# Patient Record
Sex: MALE | URBAN - METROPOLITAN AREA
[De-identification: names, ages, dates, MRNs, and addresses within clinical notes are randomized per-mention and may not be internally consistent; named-entity substitution may affect disease eponyms.]

---

## 2022-01-27 ENCOUNTER — LAB REQUISITION (OUTPATIENT)
Dept: LAB | Age: 62
End: 2022-01-27

## 2022-01-27 DIAGNOSIS — Z00.00 ENCOUNTER FOR GENERAL ADULT MEDICAL EXAMINATION WITHOUT ABNORMAL FINDINGS: ICD-10-CM

## 2022-01-27 PROCEDURE — PSEU8270 LIPID PANEL WITHOUT REFLEX: Performed by: CLINICAL MEDICAL LABORATORY

## 2022-01-27 PROCEDURE — 80061 LIPID PANEL: CPT | Performed by: CLINICAL MEDICAL LABORATORY

## 2022-01-28 LAB
CHOLEST SERPL-MCNC: 131 MG/DL
CHOLEST/HDLC SERPL: 2.3 {RATIO}
FASTING DURATION TIME PATIENT: 12 HOURS (ref 0–999)
HDLC SERPL-MCNC: 57 MG/DL
LDLC SERPL CALC-MCNC: 57 MG/DL
NONHDLC SERPL-MCNC: 74 MG/DL
TRIGL SERPL-MCNC: 86 MG/DL

## 2023-09-01 ENCOUNTER — LAB REQUISITION (OUTPATIENT)
Dept: OTHER | Facility: LAB | Age: 63
End: 2023-09-01

## 2023-09-01 DIAGNOSIS — Z00.00 ENCOUNTER FOR GENERAL ADULT MEDICAL EXAMINATION WITHOUT ABNORMAL FINDINGS: ICD-10-CM

## 2023-09-01 PROCEDURE — PSEU8270 LIPID PANEL WITHOUT REFLEX: Performed by: CLINICAL MEDICAL LABORATORY

## 2023-09-01 PROCEDURE — 80061 LIPID PANEL: CPT | Performed by: CLINICAL MEDICAL LABORATORY

## 2023-09-01 PROCEDURE — 80053 COMPREHEN METABOLIC PANEL: CPT | Performed by: CLINICAL MEDICAL LABORATORY

## 2023-09-01 PROCEDURE — PSEU8250 COMPREHENSIVE METABOLIC PANEL: Performed by: CLINICAL MEDICAL LABORATORY

## 2023-09-02 LAB
ALBUMIN SERPL-MCNC: 4 G/DL (ref 3.6–5.1)
ALBUMIN/GLOB SERPL: 1.2 {RATIO} (ref 1–2.4)
ALP SERPL-CCNC: 77 UNITS/L (ref 45–117)
ALT SERPL-CCNC: 31 UNITS/L
ANION GAP SERPL CALC-SCNC: 8 MMOL/L (ref 7–19)
AST SERPL-CCNC: 27 UNITS/L
BILIRUB SERPL-MCNC: 0.5 MG/DL (ref 0.2–1)
BUN SERPL-MCNC: 13 MG/DL (ref 6–20)
BUN/CREAT SERPL: 14 (ref 7–25)
CALCIUM SERPL-MCNC: 9.3 MG/DL (ref 8.4–10.2)
CHLORIDE SERPL-SCNC: 106 MMOL/L (ref 97–110)
CHOLEST SERPL-MCNC: 130 MG/DL
CHOLEST/HDLC SERPL: 2.5 {RATIO}
CO2 SERPL-SCNC: 28 MMOL/L (ref 21–32)
CREAT SERPL-MCNC: 0.92 MG/DL (ref 0.67–1.17)
EGFRCR SERPLBLD CKD-EPI 2021: >90 ML/MIN/{1.73_M2}
FASTING DURATION TIME PATIENT: 13 HOURS (ref 0–999)
GLOBULIN SER-MCNC: 3.3 G/DL (ref 2–4)
GLUCOSE SERPL-MCNC: 90 MG/DL (ref 70–99)
HDLC SERPL-MCNC: 52 MG/DL
LDLC SERPL CALC-MCNC: 61 MG/DL
NONHDLC SERPL-MCNC: 78 MG/DL
POTASSIUM SERPL-SCNC: 4.3 MMOL/L (ref 3.4–5.1)
PROT SERPL-MCNC: 7.3 G/DL (ref 6.4–8.2)
SODIUM SERPL-SCNC: 138 MMOL/L (ref 135–145)
TRIGL SERPL-MCNC: 83 MG/DL

## 2024-01-09 ENCOUNTER — TELEPHONE (OUTPATIENT)
Dept: PHYSICAL MEDICINE AND REHAB | Facility: CLINIC | Age: 64
End: 2024-01-09

## 2024-01-09 ENCOUNTER — OFFICE VISIT (OUTPATIENT)
Dept: PHYSICAL MEDICINE AND REHAB | Facility: CLINIC | Age: 64
End: 2024-01-09
Payer: COMMERCIAL

## 2024-01-09 VITALS — WEIGHT: 146 LBS | HEIGHT: 69 IN | BODY MASS INDEX: 21.62 KG/M2

## 2024-01-09 DIAGNOSIS — M54.12 CERVICAL RADICULOPATHY: Primary | ICD-10-CM

## 2024-01-09 DIAGNOSIS — M54.2 TRIGGER POINT OF NECK: ICD-10-CM

## 2024-01-09 DIAGNOSIS — Q76.1 CERVICAL FUSION SYNDROME: ICD-10-CM

## 2024-01-09 DIAGNOSIS — M50.30 DDD (DEGENERATIVE DISC DISEASE), CERVICAL: ICD-10-CM

## 2024-01-09 DIAGNOSIS — M76.32 IT BAND SYNDROME, LEFT: ICD-10-CM

## 2024-01-09 DIAGNOSIS — M47.812 CERVICAL FACET SYNDROME: ICD-10-CM

## 2024-01-09 DIAGNOSIS — M48.02 FORAMINAL STENOSIS OF CERVICAL REGION: ICD-10-CM

## 2024-01-09 PROBLEM — K63.5 COLON POLYPS: Status: ACTIVE | Noted: 2019-03-01

## 2024-01-09 PROBLEM — I25.10 ATHEROSCLEROSIS OF CORONARY ARTERY: Status: ACTIVE | Noted: 2019-05-01

## 2024-01-09 PROBLEM — F32.A DEPRESSION: Status: ACTIVE | Noted: 2024-01-09

## 2024-01-09 PROBLEM — M76.62 ACHILLES TENDINITIS, LEFT LEG: Status: ACTIVE | Noted: 2022-12-21

## 2024-01-09 PROBLEM — M72.2 PLANTAR FASCIITIS: Status: ACTIVE | Noted: 2023-04-25

## 2024-01-09 PROBLEM — E04.2 MULTIPLE THYROID NODULES: Status: ACTIVE | Noted: 2017-01-01

## 2024-01-09 PROBLEM — F43.22 ADJUSTMENT DISORDER WITH ANXIETY: Status: ACTIVE | Noted: 2017-01-25

## 2024-01-09 PROBLEM — L80 VITILIGO: Status: ACTIVE | Noted: 2024-01-09

## 2024-01-09 PROCEDURE — 99204 OFFICE O/P NEW MOD 45 MIN: CPT | Performed by: PHYSICAL MEDICINE & REHABILITATION

## 2024-01-09 PROCEDURE — 3008F BODY MASS INDEX DOCD: CPT | Performed by: PHYSICAL MEDICINE & REHABILITATION

## 2024-01-09 RX ORDER — VORTIOXETINE 10 MG/1
10 TABLET, FILM COATED ORAL DAILY
COMMUNITY

## 2024-01-09 RX ORDER — LORAZEPAM 2 MG/1
2 TABLET ORAL 4 TIMES DAILY
COMMUNITY

## 2024-01-09 RX ORDER — ROSUVASTATIN CALCIUM 20 MG/1
20 TABLET, COATED ORAL NIGHTLY
COMMUNITY
Start: 2022-12-13

## 2024-01-09 RX ORDER — TADALAFIL 2.5 MG/1
1 TABLET ORAL DAILY
COMMUNITY
Start: 2023-06-10

## 2024-01-09 RX ORDER — VALACYCLOVIR HYDROCHLORIDE 500 MG/1
500 TABLET, FILM COATED ORAL DAILY
COMMUNITY

## 2024-01-09 RX ORDER — LORAZEPAM 1 MG/1
1 TABLET ORAL
COMMUNITY
Start: 2023-03-30

## 2024-01-09 NOTE — H&P
[facility]  Clinic H&P    Requesting Physician: Janey Alonso MD, MD    Chief Complaint (Reason for Visit):    Chief Complaint   Patient presents with    Neck Pain     New left handed patient comes in for neck pain that radiates to temporal and frontal lobe with N/T. Pain started 2 months ago, no injury. MRI Cervical. No PT. Requesting a nerve block injections. Takes Advil. Rates pain 7/10.       History of Present Illness:  The patient is a 63 year old right-handed male with a significant past medical history of a C4-C7 ACDF who presents with left-sided neck pain with radiation up to the head and ear associated with numbness and tingling in that same distribution on the left side.  He rates his discomfort a 7 out of 10 and describes it as intermittent.  He denies any radicular symptoms down the left arm.  He has had x-rays as well as an MRI of the cervical spine has been evaluated by Dr. Collins (orthopedic surgery).  He denies any epidural injections.  Dr. Collins was recommending a selective nerve root injection at left C2 and C3.  He is a retired .    PAST MEDICAL HISTORY:   History reviewed. No pertinent past medical history.    PAST SURGICAL HISTORY:   History reviewed. No pertinent surgical history.     FAMILY HISTORY:   History reviewed. No pertinent family history.    SOCIAL HISTORY:   Social History     Occupational History    Not on file   Tobacco Use    Smoking status: Never    Smokeless tobacco: Never   Substance and Sexual Activity    Alcohol use: Not Currently    Drug use: Not Currently    Sexual activity: Not on file       CURRENT MEDICATIONS:   Current Outpatient Medications   Medication Sig Dispense Refill    LORazepam 2 MG Oral Tab Take 1 tablet (2 mg total) by mouth 4 (four) times daily.      LORazepam 1 MG Oral Tab Take 1 tablet (1 mg total) by mouth.      rosuvastatin 20 MG Oral Tab Take 1 tablet (20 mg total) by mouth nightly.       Tadalafil 2.5 MG Oral Tab Take 1 tablet by mouth daily.      valACYclovir 500 MG Oral Tab Take 1 tablet (500 mg total) by mouth daily.      TRINTELLIX 10 MG Oral Tab Take 1 tablet (10 mg total) by mouth daily.          ALLERGIES:   Allergies   Allergen Reactions    Pantoprazole OTHER (SEE COMMENTS) and NAUSEA AND VOMITING         REVIEW OF SYSTEMS:   Review of Systems   Constitutional: Negative.    HENT: Negative.    Eyes: Negative.    Respiratory: Negative.    Cardiovascular: Negative.    Gastrointestinal: Negative.    Genitourinary: Negative.    Musculoskeletal: As per HPI   Skin: Negative.    Neurological: As per HPI  Endo/Heme/Allergies: Negative.    Psychiatric/Behavioral: Negative.      All other systems reviewed and are negative. Pertinent positives and negatives noted in the HPI.        PHYSICAL EXAM:   Ht 69\"   Wt 146 lb (66.2 kg)   BMI 21.56 kg/m²     Body mass index is 21.56 kg/m².      General: No immediate distress  Head: Normocephalic/ Atraumatic  Eyes: Extra-occular movements intact.   Ears: No auricular hematoma or deformities  Mouth: No lesions or ulcerations  Heart: peripheral pulses intact. Normal capillary refill.   Lungs: Non-labored respirations  Abdomen: No abdominal guarding  Extremities: No lower extremity edema bilaterally   Skin: No lesions noted.   Cognition: alert & oriented x 3, attentive, able to follow 2 step commands, comprehention intact, spontaneous speech intact  Motor:    Musculoskeletal:    CERVICAL SPINE:  Inspection: no erythema, swelling, or obvious deformity  Palpation: Tender to palpation over the left cervical upper facets and paraspinals  ROM: Limited in flexion, extension, rotation, and sidebending bilaterally with significant amount of pain in the left posterior lateral cervical spine  Strength: 5/5 in all myotomes of the BILATERAL upper extremities   Sensation: Intact to light touch in all dermatomes of the BILATERAL upper extremities   Reflexes: 2/4 at C5, C6, C7  with a negative Alexis's sign  Spurling Test: negative for radicular symptoms down either extremity bilaterally        Gait:  Normal    Data  No results found for any previous visit.   ]      Radiology Imaging:  I reviewed with the patient his CT and MRI of the cervical spine     History: Neck sprain, initial encounter     Procedure:     MRI of cervical spine is performed without contrast using the following pulse sequences: Sagittal T1-weighted, sagittal T2-weighted, sagittal STIR and axial T2-weighted images.     Comparison: MRI 2/8/2019, plain films 1/17/2019, CT 3/3/2020     Findings:     Patient status post prior ACDF C4-C7. Corresponding osseous bridging fusion and susceptibility artifact.     There is reversal of the cervical lordosis. There are no focal regions of signal alteration in the vertebral marrow. Minimal anterolisthesis of C7 on T1 and C2 on C3-1 lesser degree. Minimal retrolisthesis of C3 on C4.     Congenital narrowing of the spinal canal. Multilevel disc space narrowing with spondylotic ridging, greatest at C3-4.     The cervical spinal cord is intrinsically normal in size without focal signal abnormality. No epidural collection.     At C2-3, there is no significant central canal stenosis. Uncovertebral and facet arthrosis (much greater on the left) causing moderate to severe left-sided neural foraminal narrowing.     At C3-4, there is a diffuse disc/osteophyte complex asymmetric to left effacing ventral CSF and just touching the cord with resultant mild central canal stenosis. Uncovertebral and facet arthrosis (much greater on the left) causing moderate right-sided and severe left-sided neural foraminal narrowing. Findings demonstrate interval progression from prior examination.     At C4-5, there is mild spondylotic ridging asymmetric to the right without significant central canal stenosis. Mild right-sided uncovertebral and facet arthrosis without significant neural foraminal narrowing.      At C5-6, there is no significant central canal stenosis. Uncovertebral and facet arthrosis causing mild right-sided neural foraminal narrowing.     At C6-7, there is spondylotic ridging without significant central canal stenosis. Uncovertebral and facet arthrosis causing mild right-sided neural foraminal narrowing.     At C7-T1, there is no significant central canal stenosis or neural foraminal narrowing.     IMPRESSION   Impression:     1. Postsurgical changes status post prior ACDF C4-C7. No acute fracture or malalignment.     2. Multilevel spondylotic changes again noted throughout the cervical spine, as detailed above, without evidence of cord lesion     3. Findings are greatest at C2-3 and C3-4 and asymmetric to the left. Findings at C3-4 demonstrate mild interval progression from prior MRI examination.     See above discussion for further details.     Electronically Verified and Signed by Attending Radiologist: Maciel Zambrano MD 12/19/2023 7:55 AM   This exam was dictated within [facility].         CLINICAL HISTORY: M48.02: Foraminal stenosis of cervical region   Z98.1: S/P cervical spinal fusion   M47.812: Cervical spondylosis     TECHNIQUE: CT CERVICAL SPINE W/O CONTRAST     CONTRAST:     COMPARISON: March 3, 2020 CT of the cervical spine.     FINDINGS:     Postoperative change, consistent with a C4-C7 anterior cervical discectomy and fusion is reidentified. No lucencies to suggest hardware loosening. No hardware fracture. The interbody spacers at C3/C4, C4/C5 and C5/C6 are unchanged in position. There is persistent curvilinear linear lucency about the C5/C6 , suggesting incomplete osseous incorporation. Alignment is normal. No locked or perched facets. No prevertebral soft tissue swelling. The atlantodens interval is not widened. The cranial cervical junction shows anatomic alignment. Vertebral body heights are intact. Moderate loss of disc height is  reidentified at C3/C4 and C7/T1.     The visualized lung apices are clear.     C2/C3: Moderate left facet degeneration. Mild left uncovertebral joint hypertrophy. Moderate left foraminal narrowing. No right foraminal narrowing or central stenoses.     C3/C4: Moderate disc and osteophyte complex. Moderate left and mild right facet degeneration. Mild biforaminal narrowing. Mild central stenoses.     C4/C5: Stable postoperative change. Small osteophytic ridge. No stenoses.     C5/C6: Stable postoperative change. Small osteophytic ridge. Mild right foraminal narrowing. No left foraminal narrowing or central stenoses.     C6/C7: Stable postoperative change. Moderate osteophytic ridge. Mild biforaminal narrowing. No central stenoses.     C7/T1: Normal.     T1/T2: Normal.     IMPRESSION   IMPRESSION:   1. Stable C4-C7 anterior cervical discectomy and fusion. Persistent lucency about the interbody spacer at C6/C7 may represent incomplete osseous incorporation. Otherwise no hardware complications.   2. Spondylosis and degenerative disc disease, resulting in moderate left foraminal narrowing at C2/C3.         Electronically Verified and Signed by Attending Radiologist: Marya Parada MD 1/4/2024 11:04 AM   This exam was dictated within [facility].     ASSESSMENT AND PLAN:  Trip is a pleasant 63-year-old male who presents with left-sided neck pain with radiation to the ear and head as well as axial pain during extension and rotation to the left.  I have independently reviewed his MRI reports of the cervical spine as well as CT scan of the cervical spine.  I have also reviewed notes from Dr. Collins.  At this time, I am recommending a diagnostic and therapeutic left C2-C3 and C3-C4 facet joint injection under local anesthesia.  If this is helpful with his symptoms, then we do not have to perform the transforaminal epidural steroid injection at C2 and C3 on the left side.  If negative, then I would  recommend a left C2 and left C3 transforaminal epidural steroid injections to be performed by Dr. Blas Adkins.  He should continue to use Tylenol for pain and also start turmeric.  We can also consider radiofrequency neurotomy if his pain improves with the facet joint injections although short-lived.  Trip was also complaining of left distal thigh pain at the distal IT band.  I believe this is due to distal IT band syndrome as he has pain with a positive Lewis sign.  He does not have any joint line tenderness at the left knee or patellar facet pain.  He will start formal physical therapy for this which she would like to do up at [facility].  Will follow-up with me 2 weeks after the facet joint injections.       RTC in 2 weeks after facet joint injections    Discharge Instructions were provided as documented in AVS summary.  The patient was in agreement with the assessment and plan.  All questions were answered.  There were no barriers to learning.         1. Cervical radiculopathy    2. Trigger point of neck    3. Cervical facet syndrome    4. Foraminal stenosis of cervical region    5. DDD (degenerative disc disease), cervical    6. Cervical fusion syndrome    7. It band syndrome, left        Alex B. Behar MD, [facility]  Physical Medicine and Rehabilitation/Sports Medicine  [facility] [place]

## 2024-01-09 NOTE — TELEPHONE ENCOUNTER
Initiated authorization for LEFT C2-C3 and C3-C4 facet joint injections CPT 96125, 40999 dx:M47.812 to be done at [facility] with [employer]  Case #[id number]  Status: Approved-authorization is not required per health plan

## 2024-01-09 NOTE — PATIENT INSTRUCTIONS
1) My office will call you to schedule the LEFt C2-C3 and C3-C4 facet joint injections under local anesthesia once the procedure is approved by your insurance carrier.    2) If positive, then we will follow up if needed. If pain is improved but not long lasting, then we can consider MBB and radiofrequency neurotomy  3) If symptoms do not improve at all, then would recommend LEFt C2 and LEFT C3 TFESI   4)Tylenol 500-1000 mg every 6-8 hours as needed for pain.  No more than 3000 mg daily.  5) Start tumeric with black pepper 1000 mg twice per day  6) Follow up with me 2 weeks after the procedure. This can be in the office or virtually.   7) start physical therapy for the left knee/Distal IT band syndrome

## 2024-01-10 NOTE — TELEPHONE ENCOUNTER
Patient has been scheduled for Left C2-3 and C3-4 facet joint injections  on 2/7/24 at the [facility] with Dr. Behar.   -Anesthesia type:  Local  -Patient was advised that if he/she does receive the covid vaccine it needs to be at least 2 weeks before or after the injection.  -Medications and allergies reviewed.  -Patient reminded to hold NSAIDs (Ibuprofen, ASA 81, Aleve, Naproxen, Mobic, Diclofenac, Etodolac, Celebrex etc.) for 3 days prior to Lumbar MBB/Facet if BMI is greater than 35. For Cervical injections only hold multivitamins, Vitamin E, Fish Oil, Phentermine/Lomaira for 7 days prior to injection and NSAIDS.   mg to be held for 7 days prior to injections.  -If patient is receiving MAC/IVCS, weight loss oral/injectable medications will need to be held for 7 days prior to injection.  -Patient informed to fast 12 hours prior to procedure with IVCS/MAC.   -If on blood thinner, clearance has been received and approved to hold this medication by provider.   -Patient informed of [facility]'s  policy:  he/she will need a  to and from procedure and must be on site for their entirety of their visit, if their ride is unable to the procedure will be cancelled.   -[facility] is located in the [facility] 1st floor,  may park in the yellow/purple parking lot.  Patient verbalized understanding and agrees with plan.  Scheduled in Epic: Yes  Scheduled in Surgical Case: Yes  Follow up appointment made: NOV: 2/27/2024 Behar, Alex, MD

## 2024-01-23 ENCOUNTER — MED REC SCAN ONLY (OUTPATIENT)
Dept: PHYSICAL MEDICINE AND REHAB | Facility: CLINIC | Age: 64
End: 2024-01-23

## 2024-02-02 PROBLEM — I25.10 ARTERIOSCLEROSIS OF CORONARY ARTERY: Status: ACTIVE | Noted: 2022-01-01

## 2024-02-02 PROBLEM — Z86.711 PERSONAL HISTORY OF PULMONARY EMBOLISM: Status: ACTIVE | Noted: 2022-01-01

## 2024-02-19 ENCOUNTER — MED REC SCAN ONLY (OUTPATIENT)
Dept: PHYSICAL MEDICINE AND REHAB | Facility: CLINIC | Age: 64
End: 2024-02-19

## 2024-02-20 ENCOUNTER — TELEPHONE (OUTPATIENT)
Dept: NEUROLOGY | Facility: CLINIC | Age: 64
End: 2024-02-20

## 2024-02-27 ENCOUNTER — TELEMEDICINE (OUTPATIENT)
Dept: PHYSICAL MEDICINE AND REHAB | Facility: CLINIC | Age: 64
End: 2024-02-27
Payer: COMMERCIAL

## 2024-02-27 DIAGNOSIS — Q76.1 CERVICAL FUSION SYNDROME: ICD-10-CM

## 2024-02-27 DIAGNOSIS — M50.30 DDD (DEGENERATIVE DISC DISEASE), CERVICAL: ICD-10-CM

## 2024-02-27 DIAGNOSIS — M48.02 FORAMINAL STENOSIS OF CERVICAL REGION: ICD-10-CM

## 2024-02-27 DIAGNOSIS — M47.812 CERVICAL FACET SYNDROME: ICD-10-CM

## 2024-02-27 DIAGNOSIS — M76.32 IT BAND SYNDROME, LEFT: ICD-10-CM

## 2024-02-27 DIAGNOSIS — M54.2 TRIGGER POINT OF NECK: ICD-10-CM

## 2024-02-27 DIAGNOSIS — M54.12 CERVICAL RADICULOPATHY: Primary | ICD-10-CM

## 2024-02-27 PROCEDURE — 99213 OFFICE O/P EST LOW 20 MIN: CPT | Performed by: PHYSICAL MEDICINE & REHABILITATION

## 2024-02-27 NOTE — PROGRESS NOTES
[facility]  Video Visit Progress Note      Telehealth outside of [facility]  Telehealth Verbal Consent   I conducted a telehealth visit with Trip Champion today, 02/27/24, which was completed using two-way, real-time interactive audio and video communication. This has been done in [place]ose[place] to provide continuity of care in the best interest of the provider-patient relationship, due to the COVID -19 public health crisis/national emergency where restrictions of face-to-face office visits are ongoing. Every conscious effort was taken to allow for sufficient and adequate time to complete the visit.  The patient was made aware of the limitations of the telehealth visit, including treatment limitations as no physical exam could be performed.  The patient was advised to call 911 or to go to the ER in case there was an emergency.  The patient was also advised of the potential privacy & security concerns related to the telehealth platform.   The patient was made aware of where to find [facility]'s notice of privacy practices, telehealth consent form and other related consent forms and documents.  which are located on the [facility] website. The patient verbally agreed to telehealth consent form, related consents and the risks discussed.    Lastly, the patient confirmed that they were in [place].   Included in this visit, time may have been spent reviewing labs, medications, radiology tests and decision making. Appropriate medical decision-making and tests are ordered as detailed in the plan of care above.  Coding/billing information is submitted for this visit based on complexity of care and/or time spent for the visit.    CHIEF COMPLAINT:    Chief Complaint   Patient presents with    Imaging    Injection    Low Back Pain       History of Present Illness:  The patient is a 63 year old right-handed male with a significant past medical history of a C4-C7 ACDF who presents with left-sided neck pain  with radiation up to the head and ear associated with numbness and tingling in that same distribution on the left side.  He is status post left C2-C3 and C3-C4 facet joint injections without improvement in his symptoms although he did initially mention he had some relief RIGHT after the procedure.  Today he rates his pain about a 7 out of 10.  He has seen anesthesia pain medicine (Dr. Connell) who is recommending a cervical epidural steroid injection.  If that is unhelpful, then he would consider a trigeminal nerve block.    PAST MEDICAL HISTORY:  Past Medical History:   Diagnosis Date    Hearing impairment     Heart attack (HCC) 2019    stent placed    Visual impairment        SURGICAL HISTORY:  Past Surgical History:   Procedure Laterality Date    ADDL NECK SPINE FUSION      OTHER SURGICAL HISTORY      heart stent after MI    SPINE SURGERY PROCEDURE UNLISTED  2022    TOTAL HIP REPLACEMENT  2022       SOCIAL HISTORY:   Social History     Occupational History    Not on file   Tobacco Use    Smoking status: Never    Smokeless tobacco: Never   Substance and Sexual Activity    Alcohol use: Not Currently    Drug use: Not Currently    Sexual activity: Not on file       FAMILY HISTORY:   History reviewed. No pertinent family history.    CURRENT MEDICATIONS:   Current Outpatient Medications   Medication Sig Dispense Refill    Aspirin 81 MG Oral Cap Take 1 tablet by mouth daily.      LORazepam 2 MG Oral Tab Take 1 tablet (2 mg total) by mouth 4 (four) times daily.      LORazepam 1 MG Oral Tab Take 1 tablet (1 mg total) by mouth.      rosuvastatin 20 MG Oral Tab Take 1 tablet (20 mg total) by mouth nightly.      Tadalafil 2.5 MG Oral Tab Take 1 tablet by mouth daily.      valACYclovir 500 MG Oral Tab Take 1 tablet (500 mg total) by mouth daily.      TRINTELLIX 10 MG Oral Tab Take 1 tablet (10 mg total) by mouth daily.         ALLERGIES:   Allergies   Allergen Reactions    Pantoprazole OTHER (SEE COMMENTS) and NAUSEA AND  VOMITING       REVIEW OF SYSTEMS:   Review of Systems   Constitutional: Negative.    HENT: Negative.    Eyes: Negative.    Respiratory: Negative.    Cardiovascular: Negative.    Gastrointestinal: Negative.    Genitourinary: Negative.    Musculoskeletal: As per HPI  Skin: Negative.    Neurological: As per HPI  Endo/Heme/Allergies: Negative.    Psychiatric/Behavioral: Negative.      All other systems reviewed and are negative. Pertinent positives and negatives noted in the HPI.        PHYSICAL EXAM:     There is no height or weight on file to calculate BMI.    General: No immediate distress  Head: Normocephalic/ Atraumatic  Eyes: Extra-occular movements intact  Ears/Nose/Throat:  External appearance identifies normal appearance without obvious deformity  Cardiovascular: No cyanosis, clubbing or edema  Respiratory: Non-labored respirations  Skin: No lesions noted   Neurological: alert & oriented x 3, attentive, able to follow commands, comprehention intact, spontaneous speech intact  Psychiatric: Mood and affect appropriate        Data  No results found for any previous visit.   ]      Radiology Imaging:  I reviewed with the patient his CT and MRI of the cervical spine      History: Neck sprain, initial encounter     Procedure:     MRI of cervical spine is performed without contrast using the following pulse sequences: Sagittal T1-weighted, sagittal T2-weighted, sagittal STIR and axial T2-weighted images.     Comparison: MRI 2/8/2019, plain films 1/17/2019, CT 3/3/2020     Findings:     Patient status post prior ACDF C4-C7. Corresponding osseous bridging fusion and susceptibility artifact.     There is reversal of the cervical lordosis. There are no focal regions of signal alteration in the vertebral marrow. Minimal anterolisthesis of C7 on T1 and C2 on C3-1 lesser degree. Minimal retrolisthesis of C3 on C4.     Congenital narrowing of the spinal canal. Multilevel disc space narrowing with spondylotic ridging, greatest  at C3-4.     The cervical spinal cord is intrinsically normal in size without focal signal abnormality. No epidural collection.     At C2-3, there is no significant central canal stenosis. Uncovertebral and facet arthrosis (much greater on the left) causing moderate to severe left-sided neural foraminal narrowing.     At C3-4, there is a diffuse disc/osteophyte complex asymmetric to left effacing ventral CSF and just touching the cord with resultant mild central canal stenosis. Uncovertebral and facet arthrosis (much greater on the left) causing moderate right-sided and severe left-sided neural foraminal narrowing. Findings demonstrate interval progression from prior examination.     At C4-5, there is mild spondylotic ridging asymmetric to the right without significant central canal stenosis. Mild right-sided uncovertebral and facet arthrosis without significant neural foraminal narrowing.     At C5-6, there is no significant central canal stenosis. Uncovertebral and facet arthrosis causing mild right-sided neural foraminal narrowing.     At C6-7, there is spondylotic ridging without significant central canal stenosis. Uncovertebral and facet arthrosis causing mild right-sided neural foraminal narrowing.     At C7-T1, there is no significant central canal stenosis or neural foraminal narrowing.     IMPRESSION   Impression:     1. Postsurgical changes status post prior ACDF C4-C7. No acute fracture or malalignment.     2. Multilevel spondylotic changes again noted throughout the cervical spine, as detailed above, without evidence of cord lesion     3. Findings are greatest at C2-3 and C3-4 and asymmetric to the left. Findings at C3-4 demonstrate mild interval progression from prior MRI examination.     See above discussion for further details.     Electronically Verified and Signed by Attending Radiologist: Maciel Zambrano MD 12/19/2023 7:55 AM   This exam was dictated within [facility]  [facility].            CLINICAL HISTORY: M48.02: Foraminal stenosis of cervical region   Z98.1: S/P cervical spinal fusion   M47.812: Cervical spondylosis     TECHNIQUE: CT CERVICAL SPINE W/O CONTRAST     CONTRAST:     COMPARISON: March 3, 2020 CT of the cervical spine.     FINDINGS:     Postoperative change, consistent with a C4-C7 anterior cervical discectomy and fusion is reidentified. No lucencies to suggest hardware loosening. No hardware fracture. The interbody spacers at C3/C4, C4/C5 and C5/C6 are unchanged in position. There is persistent curvilinear linear lucency about the C5/C6 , suggesting incomplete osseous incorporation. Alignment is normal. No locked or perched facets. No prevertebral soft tissue swelling. The atlantodens interval is not widened. The cranial cervical junction shows anatomic alignment. Vertebral body heights are intact. Moderate loss of disc height is reidentified at C3/C4 and C7/T1.     The visualized lung apices are clear.     C2/C3: Moderate left facet degeneration. Mild left uncovertebral joint hypertrophy. Moderate left foraminal narrowing. No right foraminal narrowing or central stenoses.     C3/C4: Moderate disc and osteophyte complex. Moderate left and mild right facet degeneration. Mild biforaminal narrowing. Mild central stenoses.     C4/C5: Stable postoperative change. Small osteophytic ridge. No stenoses.     C5/C6: Stable postoperative change. Small osteophytic ridge. Mild right foraminal narrowing. No left foraminal narrowing or central stenoses.     C6/C7: Stable postoperative change. Moderate osteophytic ridge. Mild biforaminal narrowing. No central stenoses.     C7/T1: Normal.     T1/T2: Normal.     IMPRESSION   IMPRESSION:   1. Stable C4-C7 anterior cervical discectomy and fusion. Persistent lucency about the interbody spacer at C6/C7 may represent incomplete osseous incorporation. Otherwise no hardware complications.   2. Spondylosis and degenerative disc  disease, resulting in moderate left foraminal narrowing at C2/C3.         Electronically Verified and Signed by Attending Radiologist: Marya Parada MD 1/4/2024 11:04 AM   This exam was dictated within [facility].       ASSESSMENT AND PLAN:  Trip is a pleasant 63-year-old male who presents for follow-up of his left-sided neck pain with scalp burning as well as facial burning.  He is status post left C2-C3 and C3-C4 facet joint injections on 2/7/2024 without significant improvement.  Therefore, I had recommended a left C2 and left C3 transforaminal epidural steroid injection but he has seen another physician and they will try a C7-T1 interlaminar epidural steroid injection.  If that is not helpful, Dr. Connell would also try trigeminal nerve block.  At this time, he will follow-up with Dr. Connell and he will follow-up with me as needed going forward.       RTC in as needed  Discharge Instructions were provided as documented in AVS summary.  The patient was in agreement with the assessment and plan.  All questions were answered.  There were no barriers to learning.    We discussed that a telemedicine visit is in place of an office visit; however, this limits the ability to perform a thorough physical examination which may affect objective findings related to a specific condition and can affect treatment.  We also discussed that NSAIDs may mask or worsen COVID-19 infection symptoms.  The patient was also informed that corticosteroids, in any form, may significantly decrease immune response and may increase risk and complications of infection.    The patient was advised that given the current situation with COVID-19, it is in his/her best interest to socially distance his/herself. Given this, we are not recommending any elective procedures or office visits at the outpatient surgery center or in the office respectively unless deemed necessary.  My staff will be reaching out to the patient  for the elective procedure when it is considered appropriate and the patient can follow-up in office as well when appropriate.  In the meantime, I will be available for telephone and video encounter.          1. Cervical radiculopathy    2. Trigger point of neck    3. Cervical facet syndrome    4. Foraminal stenosis of cervical region    5. DDD (degenerative disc disease), cervical    6. Cervical fusion syndrome    7. It band syndrome, left        Alex B. Behar MD  Physical Medicine and Rehabilitation/Sports Medicine  [facility]

## 2025-05-22 ENCOUNTER — APPOINTMENT (OUTPATIENT)
Dept: GENERAL RADIOLOGY | Age: 65
End: 2025-05-22
Attending: EMERGENCY MEDICINE

## 2025-05-22 ENCOUNTER — APPOINTMENT (OUTPATIENT)
Dept: CT IMAGING | Age: 65
End: 2025-05-22

## 2025-05-22 ENCOUNTER — HOSPITAL ENCOUNTER (EMERGENCY)
Age: 65
Discharge: HOME | End: 2025-05-22
Attending: EMERGENCY MEDICINE

## 2025-05-22 VITALS
BODY MASS INDEX: 22.66 KG/M2 | OXYGEN SATURATION: 97 % | HEART RATE: 64 BPM | DIASTOLIC BLOOD PRESSURE: 88 MMHG | TEMPERATURE: 96.8 F | WEIGHT: 153 LBS | HEIGHT: 69 IN | RESPIRATION RATE: 20 BRPM | SYSTOLIC BLOOD PRESSURE: 146 MMHG

## 2025-05-22 DIAGNOSIS — R42 DIZZINESS: Primary | ICD-10-CM

## 2025-05-22 LAB
ALBUMIN SERPL-MCNC: 3.9 G/DL (ref 3.4–5)
ALBUMIN/GLOB SERPL: 1.1 {RATIO} (ref 1–2.4)
ALP SERPL-CCNC: 91 UNITS/L (ref 45–117)
ALT SERPL-CCNC: 32 UNITS/L
ANION GAP SERPL CALC-SCNC: 16 MMOL/L (ref 7–19)
AST SERPL-CCNC: 30 UNITS/L
ATRIAL RATE (BPM): 75
BASOPHILS # BLD: 0 K/MCL (ref 0–0.3)
BASOPHILS NFR BLD: 0 %
BILIRUB SERPL-MCNC: 0.5 MG/DL (ref 0.2–1)
BUN SERPL-MCNC: 15 MG/DL (ref 6–20)
BUN/CREAT SERPL: 16 (ref 7–25)
CALCIUM SERPL-MCNC: 9.3 MG/DL (ref 8.4–10.2)
CHLORIDE SERPL-SCNC: 103 MMOL/L (ref 97–110)
CO2 SERPL-SCNC: 25 MMOL/L (ref 21–32)
CREAT SERPL-MCNC: 0.96 MG/DL (ref 0.67–1.17)
DEPRECATED RDW RBC: 40.4 FL (ref 39–50)
EGFRCR SERPLBLD CKD-EPI 2021: 87 ML/MIN/{1.73_M2}
EOSINOPHIL # BLD: 0.2 K/MCL (ref 0–0.5)
EOSINOPHIL NFR BLD: 2 %
ERYTHROCYTE [DISTWIDTH] IN BLOOD: 12.4 % (ref 11–15)
FASTING DURATION TIME PATIENT: ABNORMAL H
GLOBULIN SER-MCNC: 3.4 G/DL (ref 2–4)
GLUCOSE SERPL-MCNC: 113 MG/DL (ref 70–99)
HCT VFR BLD CALC: 42.2 % (ref 39–51)
HGB BLD-MCNC: 14.5 G/DL (ref 13–17)
IMM GRANULOCYTES # BLD AUTO: 0 K/MCL (ref 0–0.2)
IMM GRANULOCYTES # BLD: 0 %
LYMPHOCYTES # BLD: 1.9 K/MCL (ref 1–4)
LYMPHOCYTES NFR BLD: 21 %
MCH RBC QN AUTO: 30.9 PG (ref 26–34)
MCHC RBC AUTO-ENTMCNC: 34.4 G/DL (ref 32–36.5)
MCV RBC AUTO: 90 FL (ref 78–100)
MONOCYTES # BLD: 0.9 K/MCL (ref 0.3–0.9)
MONOCYTES NFR BLD: 9 %
NEUTROPHILS # BLD: 6.1 K/MCL (ref 1.8–7.7)
NEUTROPHILS NFR BLD: 68 %
NRBC BLD MANUAL-RTO: 0 /100 WBC
P AXIS (DEGREES): 60
PLATELET # BLD AUTO: 275 K/MCL (ref 140–450)
POTASSIUM SERPL-SCNC: 3.7 MMOL/L (ref 3.4–5.1)
PR-INTERVAL (MSEC): 138
PROT SERPL-MCNC: 7.3 G/DL (ref 6.4–8.2)
QRS-INTERVAL (MSEC): 94
QT-INTERVAL (MSEC): 408
QTC: 456
R AXIS (DEGREES): 45
RBC # BLD: 4.69 MIL/MCL (ref 4.5–5.9)
REPORT TEXT: NORMAL
SODIUM SERPL-SCNC: 140 MMOL/L (ref 135–145)
T AXIS (DEGREES): 44
TROPONIN I SERPL DL<=0.01 NG/ML-MCNC: 18 NG/L
VENTRICULAR RATE EKG/MIN (BPM): 75
WBC # BLD: 9.2 K/MCL (ref 4.2–11)

## 2025-05-22 PROCEDURE — 10002805 HB CONTRAST AGENT

## 2025-05-22 PROCEDURE — 71045 X-RAY EXAM CHEST 1 VIEW: CPT

## 2025-05-22 PROCEDURE — 99284 EMERGENCY DEPT VISIT MOD MDM: CPT

## 2025-05-22 PROCEDURE — 84484 ASSAY OF TROPONIN QUANT: CPT | Performed by: EMERGENCY MEDICINE

## 2025-05-22 PROCEDURE — 10002803 HB RX 637: Performed by: EMERGENCY MEDICINE

## 2025-05-22 PROCEDURE — 93010 ELECTROCARDIOGRAM REPORT: CPT | Performed by: INTERNAL MEDICINE

## 2025-05-22 PROCEDURE — 10002807 HB RX 258: Performed by: EMERGENCY MEDICINE

## 2025-05-22 PROCEDURE — 70498 CT ANGIOGRAPHY NECK: CPT

## 2025-05-22 PROCEDURE — 80053 COMPREHEN METABOLIC PANEL: CPT | Performed by: EMERGENCY MEDICINE

## 2025-05-22 PROCEDURE — 99285 EMERGENCY DEPT VISIT HI MDM: CPT | Performed by: EMERGENCY MEDICINE

## 2025-05-22 PROCEDURE — 96360 HYDRATION IV INFUSION INIT: CPT

## 2025-05-22 PROCEDURE — 93005 ELECTROCARDIOGRAM TRACING: CPT | Performed by: EMERGENCY MEDICINE

## 2025-05-22 PROCEDURE — 85025 COMPLETE CBC W/AUTO DIFF WBC: CPT | Performed by: EMERGENCY MEDICINE

## 2025-05-22 RX ORDER — MECLIZINE HCL 12.5 MG 12.5 MG/1
25 TABLET ORAL ONCE
Status: COMPLETED | OUTPATIENT
Start: 2025-05-22 | End: 2025-05-22

## 2025-05-22 RX ORDER — MECLIZINE HYDROCHLORIDE 25 MG/1
25 TABLET ORAL 3 TIMES DAILY PRN
Qty: 15 TABLET | Refills: 0 | Status: SHIPPED | OUTPATIENT
Start: 2025-05-22

## 2025-05-22 RX ADMIN — SODIUM CHLORIDE 1000 ML: 9 INJECTION, SOLUTION INTRAVENOUS at 17:07

## 2025-05-22 RX ADMIN — MECLIZINE HCL 12.5 MG 25 MG: 12.5 TABLET ORAL at 17:10

## 2025-05-22 RX ADMIN — IOHEXOL 100 ML: 350 INJECTION, SOLUTION INTRAVENOUS at 18:09

## 2025-05-22 ASSESSMENT — MOVEMENT AND STRENGTH ASSESSMENTS
HEAD_NECK: FULL RANGE OF MOTION
FACE_JAW: FACE SYMMETRICAL;FULL RANGE OF MOTION;TONGUE MIDLINE
RLE: EQUAL STRENGTH/TONE/MOVEMENT
LUE: EQUAL STRENGTH/TONE/MOVEMENT
LLE: EQUAL STRENGTH/TONE/MOVEMENT
RUE: EQUAL STRENGTH/TONE/MOVEMENT

## 2025-05-22 ASSESSMENT — PAIN SCALES - GENERAL: PAINLEVEL_OUTOF10: 0

## 2025-05-23 LAB
RAINBOW EXTRA TUBES HOLD SPECIMEN: NORMAL

## 2025-06-24 ENCOUNTER — APPOINTMENT (OUTPATIENT)
Dept: SPORTS MEDICINE | Age: 65
End: 2025-06-24

## 2025-06-24 VITALS — HEART RATE: 63 BPM | OXYGEN SATURATION: 97 % | SYSTOLIC BLOOD PRESSURE: 117 MMHG | DIASTOLIC BLOOD PRESSURE: 72 MMHG

## 2025-06-24 DIAGNOSIS — M17.12 PRIMARY OSTEOARTHRITIS OF LEFT KNEE: Primary | ICD-10-CM

## 2025-06-24 PROCEDURE — 99203 OFFICE O/P NEW LOW 30 MIN: CPT | Performed by: STUDENT IN AN ORGANIZED HEALTH CARE EDUCATION/TRAINING PROGRAM

## 2025-06-24 ASSESSMENT — ENCOUNTER SYMPTOMS
FEVER: 0
FATIGUE: 0
BACK PAIN: 0
WEAKNESS: 0
WOUND: 0
DIAPHORESIS: 0
COLOR CHANGE: 0
CHILLS: 0
NUMBNESS: 0

## 2025-06-25 ENCOUNTER — TELEPHONE (OUTPATIENT)
Dept: PAIN MANAGEMENT | Age: 65
End: 2025-06-25

## 2025-08-13 ENCOUNTER — OFFICE VISIT (OUTPATIENT)
Dept: PAIN MANAGEMENT | Age: 65
End: 2025-08-13
Attending: STUDENT IN AN ORGANIZED HEALTH CARE EDUCATION/TRAINING PROGRAM

## 2025-08-13 VITALS
OXYGEN SATURATION: 100 % | DIASTOLIC BLOOD PRESSURE: 69 MMHG | RESPIRATION RATE: 16 BRPM | HEIGHT: 69 IN | SYSTOLIC BLOOD PRESSURE: 113 MMHG | TEMPERATURE: 97.9 F | WEIGHT: 146 LBS | HEART RATE: 57 BPM | BODY MASS INDEX: 21.62 KG/M2

## 2025-08-13 DIAGNOSIS — M25.562 CHRONIC PAIN OF LEFT KNEE: Primary | ICD-10-CM

## 2025-08-13 DIAGNOSIS — G89.29 CHRONIC PAIN OF LEFT KNEE: Primary | ICD-10-CM

## 2025-08-13 PROCEDURE — 99213 OFFICE O/P EST LOW 20 MIN: CPT | Performed by: ANESTHESIOLOGY

## 2025-08-13 PROCEDURE — 99204 OFFICE O/P NEW MOD 45 MIN: CPT | Performed by: ANESTHESIOLOGY

## 2025-08-13 ASSESSMENT — PAIN SCALES - GENERAL: PAINLEVEL_OUTOF10: 5

## 2025-09-03 ENCOUNTER — OFFICE VISIT (OUTPATIENT)
Dept: PAIN MANAGEMENT | Age: 65
End: 2025-09-03
Attending: ANESTHESIOLOGY

## 2025-09-03 ENCOUNTER — IMAGING SERVICES (OUTPATIENT)
Dept: PAIN MANAGEMENT | Age: 65
End: 2025-09-03
Attending: ANESTHESIOLOGY

## 2025-09-03 VITALS
OXYGEN SATURATION: 96 % | BODY MASS INDEX: 21.62 KG/M2 | SYSTOLIC BLOOD PRESSURE: 113 MMHG | TEMPERATURE: 97.8 F | RESPIRATION RATE: 16 BRPM | HEIGHT: 69 IN | DIASTOLIC BLOOD PRESSURE: 73 MMHG | HEART RATE: 62 BPM | WEIGHT: 146 LBS

## 2025-09-03 DIAGNOSIS — G89.29 CHRONIC PAIN OF LEFT KNEE: ICD-10-CM

## 2025-09-03 DIAGNOSIS — M25.562 CHRONIC PAIN OF LEFT KNEE: ICD-10-CM

## 2025-09-03 DIAGNOSIS — M25.562 LEFT KNEE PAIN, UNSPECIFIED CHRONICITY: ICD-10-CM

## 2025-09-03 PROCEDURE — 10002800 HB RX 250 W HCPCS: Performed by: ANESTHESIOLOGY

## 2025-09-03 RX ORDER — LIDOCAINE HYDROCHLORIDE 20 MG/ML
3 INJECTION, SOLUTION EPIDURAL; INFILTRATION; INTRACAUDAL; PERINEURAL ONCE
Status: COMPLETED | OUTPATIENT
Start: 2025-09-03 | End: 2025-09-03

## 2025-09-03 RX ORDER — LIDOCAINE HYDROCHLORIDE 10 MG/ML
5 INJECTION, SOLUTION EPIDURAL; INFILTRATION; INTRACAUDAL; PERINEURAL ONCE
Status: COMPLETED | OUTPATIENT
Start: 2025-09-03 | End: 2025-09-03

## 2025-09-03 RX ADMIN — LIDOCAINE HYDROCHLORIDE 50 MG: 10 INJECTION, SOLUTION EPIDURAL; INFILTRATION; INTRACAUDAL; PERINEURAL at 12:10

## 2025-09-03 RX ADMIN — LIDOCAINE HYDROCHLORIDE 60 MG: 20 INJECTION, SOLUTION EPIDURAL; INFILTRATION; INTRACAUDAL; PERINEURAL at 12:20
